# Patient Record
Sex: MALE | Race: BLACK OR AFRICAN AMERICAN | Employment: UNEMPLOYED | URBAN - METROPOLITAN AREA
[De-identification: names, ages, dates, MRNs, and addresses within clinical notes are randomized per-mention and may not be internally consistent; named-entity substitution may affect disease eponyms.]

---

## 2024-03-21 ENCOUNTER — HOSPITAL ENCOUNTER (EMERGENCY)
Age: 49
Discharge: HOME | End: 2024-03-21
Attending: EMERGENCY MEDICINE

## 2024-03-21 VITALS
HEIGHT: 70 IN | DIASTOLIC BLOOD PRESSURE: 104 MMHG | OXYGEN SATURATION: 91 % | RESPIRATION RATE: 22 BRPM | HEART RATE: 80 BPM | SYSTOLIC BLOOD PRESSURE: 158 MMHG | TEMPERATURE: 97.6 F | WEIGHT: 160 LBS | BODY MASS INDEX: 22.9 KG/M2

## 2024-03-21 DIAGNOSIS — F10.920 ACUTE ALCOHOLIC INTOXICATION WITHOUT COMPLICATION (HCC): ICD-10-CM

## 2024-03-21 DIAGNOSIS — T40.601A OPIATE OVERDOSE, ACCIDENTAL OR UNINTENTIONAL, INITIAL ENCOUNTER (HCC): Primary | ICD-10-CM

## 2024-03-21 LAB
ANION GAP SERPL CALC-SCNC: 9 MMOL/L (ref 2–11)
BASOPHILS # BLD: 0 K/UL (ref 0–0.2)
BASOPHILS NFR BLD: 1 % (ref 0–2)
BUN SERPL-MCNC: 10 MG/DL (ref 6–23)
CALCIUM SERPL-MCNC: 8.3 MG/DL (ref 8.3–10.4)
CHLORIDE SERPL-SCNC: 113 MMOL/L (ref 103–113)
CO2 SERPL-SCNC: 21 MMOL/L (ref 21–32)
CREAT SERPL-MCNC: 0.9 MG/DL (ref 0.8–1.5)
DIFFERENTIAL METHOD BLD: NORMAL
EOSINOPHIL # BLD: 0.1 K/UL (ref 0–0.8)
EOSINOPHIL NFR BLD: 2 % (ref 0.5–7.8)
ERYTHROCYTE [DISTWIDTH] IN BLOOD BY AUTOMATED COUNT: 14.3 % (ref 11.9–14.6)
ETHANOL SERPL-MCNC: 152 MG/DL (ref 0–0.08)
GLUCOSE SERPL-MCNC: 104 MG/DL (ref 65–100)
HCT VFR BLD AUTO: 43.3 % (ref 41.1–50.3)
HGB BLD-MCNC: 13.9 G/DL (ref 13.6–17.2)
IMM GRANULOCYTES # BLD AUTO: 0 K/UL (ref 0–0.5)
IMM GRANULOCYTES NFR BLD AUTO: 0 % (ref 0–5)
LYMPHOCYTES # BLD: 2 K/UL (ref 0.5–4.6)
LYMPHOCYTES NFR BLD: 32 % (ref 13–44)
MCH RBC QN AUTO: 29.6 PG (ref 26.1–32.9)
MCHC RBC AUTO-ENTMCNC: 32.1 G/DL (ref 31.4–35)
MCV RBC AUTO: 92.1 FL (ref 82–102)
MONOCYTES # BLD: 0.5 K/UL (ref 0.1–1.3)
MONOCYTES NFR BLD: 8 % (ref 4–12)
NEUTS SEG # BLD: 3.4 K/UL (ref 1.7–8.2)
NEUTS SEG NFR BLD: 57 % (ref 43–78)
NRBC # BLD: 0 K/UL (ref 0–0.2)
PLATELET # BLD AUTO: 245 K/UL (ref 150–450)
PMV BLD AUTO: 9.6 FL (ref 9.4–12.3)
POTASSIUM SERPL-SCNC: 4.7 MMOL/L (ref 3.5–5.1)
RBC # BLD AUTO: 4.7 M/UL (ref 4.23–5.6)
SODIUM SERPL-SCNC: 143 MMOL/L (ref 136–146)
WBC # BLD AUTO: 6.1 K/UL (ref 4.3–11.1)

## 2024-03-21 PROCEDURE — 6360000002 HC RX W HCPCS: Performed by: EMERGENCY MEDICINE

## 2024-03-21 PROCEDURE — 96375 TX/PRO/DX INJ NEW DRUG ADDON: CPT

## 2024-03-21 PROCEDURE — 80048 BASIC METABOLIC PNL TOTAL CA: CPT

## 2024-03-21 PROCEDURE — 96376 TX/PRO/DX INJ SAME DRUG ADON: CPT

## 2024-03-21 PROCEDURE — 6360000002 HC RX W HCPCS: Performed by: PHYSICIAN ASSISTANT

## 2024-03-21 PROCEDURE — 85025 COMPLETE CBC W/AUTO DIFF WBC: CPT

## 2024-03-21 PROCEDURE — 82077 ASSAY SPEC XCP UR&BREATH IA: CPT

## 2024-03-21 PROCEDURE — 6370000000 HC RX 637 (ALT 250 FOR IP): Performed by: EMERGENCY MEDICINE

## 2024-03-21 PROCEDURE — 99284 EMERGENCY DEPT VISIT MOD MDM: CPT

## 2024-03-21 PROCEDURE — 96374 THER/PROPH/DIAG INJ IV PUSH: CPT

## 2024-03-21 RX ORDER — ONDANSETRON 2 MG/ML
8 INJECTION INTRAMUSCULAR; INTRAVENOUS
Status: COMPLETED | OUTPATIENT
Start: 2024-03-21 | End: 2024-03-21

## 2024-03-21 RX ORDER — NALOXONE HYDROCHLORIDE 0.4 MG/ML
0.4 INJECTION, SOLUTION INTRAMUSCULAR; INTRAVENOUS; SUBCUTANEOUS PRN
Status: DISCONTINUED | OUTPATIENT
Start: 2024-03-21 | End: 2024-03-21 | Stop reason: HOSPADM

## 2024-03-21 RX ORDER — NALOXONE HYDROCHLORIDE 4 MG/.1ML
1 SPRAY NASAL ONCE
Status: COMPLETED | OUTPATIENT
Start: 2024-03-21 | End: 2024-03-21

## 2024-03-21 RX ADMIN — NALOXONE HYDROCHLORIDE 0.4 MG: 0.4 INJECTION, SOLUTION INTRAMUSCULAR; INTRAVENOUS; SUBCUTANEOUS at 07:48

## 2024-03-21 RX ADMIN — NALOXONE HYDROCHLORIDE 0.4 MG: 0.4 INJECTION, SOLUTION INTRAMUSCULAR; INTRAVENOUS; SUBCUTANEOUS at 06:01

## 2024-03-21 RX ADMIN — NALXONE HYDROCHLORIDE 0.4 MG: 0.4 INJECTION INTRAMUSCULAR; INTRAVENOUS; SUBCUTANEOUS at 04:25

## 2024-03-21 RX ADMIN — ONDANSETRON 8 MG: 2 INJECTION INTRAMUSCULAR; INTRAVENOUS at 13:48

## 2024-03-21 ASSESSMENT — LIFESTYLE VARIABLES
HOW MANY STANDARD DRINKS CONTAINING ALCOHOL DO YOU HAVE ON A TYPICAL DAY: PATIENT DECLINED
HOW OFTEN DO YOU HAVE A DRINK CONTAINING ALCOHOL: PATIENT DECLINED

## 2024-03-21 NOTE — CARE COORDINATION
Patient admits to be a regular drug user. Says he has suffered from [place] for a long time. He says he has been using opiates, methamphetamine, cocaine, and drinking alcohol. Given his opiate and alcohol use he is going to need detox before any long term treatment is possible. I'm giving him the number to the [facility] detox to get registered and suggesting he follow up with them daily to obtain a bed. I'm also suggesting he follow up with me so when he gets the bed we can work together to help him put a long term plan in place.

## 2024-03-21 NOTE — DISCHARGE INSTRUCTIONS
Stop using illegal drugs  Limit your alcohol consumption  Return to the shelter  Follow-up with drug rehab referrals

## 2024-03-21 NOTE — ED NOTES
Pt having trbl staying awake during assessment to answer questions at this time.      Karla Cruz, RN  03/21/24 4505

## 2024-03-21 NOTE — DISCHARGE SUMMARY
I have reviewed discharge instructions with the patient.  The patient verbalized understanding.    Patient left ED via Discharge Method: ambulatory to Home with self.    Opportunity for questions and clarification provided.       Patient given 1 scripts.         To continue your aftercare when you leave the hospital, you may receive an automated call from our care team to check in on how you are doing.  This is a free service and part of our promise to provide the best care and service to meet your aftercare needs.” If you have questions, or wish to unsubscribe from this service please call [phone].  Thank you for Choosing our [facility] Emergency Department.

## 2024-03-21 NOTE — ED PROVIDER NOTES
Emergency Department Provider Signout / Continuation of Care Note         DISPOSITION Decision To Discharge 03/21/2024 01:00:26 PM       ICD-10-CM    1. Opiate overdose, accidental or unintentional, initial encounter ([facility])  T40.601A       2. Acute alcoholic intoxication without complication ([facility])  F10.920           The patient's care was signed out to me at shift change.      Final Plan      Patient monitored for approximately 7 more hours  Patient has gradually improved and is no longer needing oxygen and not having some  Episodes  Patient has been seen by case management and favor  At this point the patient is stable for discharge and will be directed back to the [facility]  ED Course as of 03/21/24 1316   Thu Mar 21, 2024   0601 Patient requiring subsequent narcan dose of 0.4mg [MO]      ED Course User Index  [MO] Chan West PA       1 or more acute illnesses that pose a threat to life or bodily function.   Patient was discharged risks and benefits of hospitalization were considered.  Shared medical decision making was utilized in creating the patients health plan today.  I reviewed external records: ED visit note from an outside group.       Patient provided Narcan and specific instructions at the time of discharge secondary to his opiate overdose               Tony Yeboah MD  03/21/24 1316       Tony Yeboah MD  03/21/24 1348

## 2024-03-21 NOTE — CARE COORDINATION
Chart review complete, CM met with pt at bedside, pt easily awakens to speak with CM, pt states he is from [facility] and was sent from [place] to [facility] but was kicked out d/t drinking. Pt states he has been homeless since leaving [facility], pt states he has [employer] insurance but staff unable to verify at this time. Kurt with [facility] to see pt for assistance. Pt denies Drug use to CM but confirms with [facility]  of drug use prior to admission to ED.  Demographics, insurance and PCP confirmed with pt.    Unsure of dispo at this time, Kurt with [facility] working with pt to assist with detox plans.    CM staff will remain available to assist as needed with dc needs.       03/21/24 1010   Service Assessment   Patient Orientation Alert and Oriented   Cognition Alert   History Provided By Patient   Primary Caregiver Self   Accompanied By/Relationship none   Support Systems Parent   Patient's Healthcare Decision Maker is: Legal Next of Kin   PCP Verified by CM Yes   Prior Functional Level Independent in ADLs/IADLs   Current Functional Level Independent in ADLs/IADLs   Can patient return to prior living arrangement Yes   Ability to make needs known: Good   Family able to assist with home care needs: Yes   Would you like for me to discuss the discharge plan with any other family members/significant others, and if so, who? Yes   Financial Resources None   Community Resources Housing   CM/SW Referral Shelter placement;Homeless requesting intervention;Other (see comment)  (ETOH/Drug use)   Social/Functional History   Lives With Alone   Type of Home Homeless   Home Equipment None   ADL Assistance Independent   Ambulation Assistance Independent   Transfer Assistance Independent   Occupation Unemployed   Discharge Planning   Current Services Prior To Admission None   Potential Assistance Needed Transportation;Prescription Assistance   DME Ordered? No   Potential Assistance Purchasing Medications Yes   Type of Home Care Services None   Patient

## 2024-03-21 NOTE — ED PROVIDER NOTES
Emergency Department Provider Note       PCP: None, None   Age: 48 y.o.   Sex: male     DISPOSITION Decision To Discharge 03/21/2024 01:00:26 PM       ICD-10-CM    1. Opiate overdose, accidental or unintentional, initial encounter ([facility])  T40.601A       2. Acute alcoholic intoxication without complication ([facility])  F10.920         Medical Decision Making     Presented here via EMS with complaint of suspected overdose.  He has been pleasant here, receiving 2 doses of Narcan.  Patient will be observed for period time given needed Narcan doses and alcohol on board.  Case was discussed with oncoming physician, Dr. Yeboah.  He will monitor patient and be responsible for eventual disposition  ED Course as of 03/21/24 2222   Thu Mar 21, 2024   0601 Patient requiring subsequent narcan dose of 0.4mg [MO]      ED Course User Index  [MO] Chan West PA     1 or more acute illnesses that pose a threat to life or bodily function.   Over the counter drug management performed.  Prescription drug management performed.  Drug therapy given requiring intensive monitoring for toxicity.  Patient was discharged risks and benefits of hospitalization were considered.  Shared medical decision making was utilized in creating the patients health plan today.    I independently ordered and reviewed each unique test.                     Voice dictation software was used during the making of this note.  This software is not perfect and grammatical and other typographical errors may be present.  This note has not been completely proofread for errors.    History     48-year-old male brought in by EMS for overdose.  EMS was called out by bystanders from local street.  [place] and he got a little [place].  He adamantly denies any opioid drug used this evening.  EMS reports they found patient unresponsive to sternal rubs and actually saw some individuals beginning CPR.  They administered 0.5 mg of Narcan with

## 2024-03-21 NOTE — ED NOTES
Pt continuing to get out of the bed and taking off vitals equipment.      Karla Cruz, RN  03/21/24 0526

## 2024-03-21 NOTE — ED TRIAGE NOTES
Patient arrives via EMS after being found unresponsive. EMS gave 0.5 mg narcan with improvement in mental status. Upon arrival to ED patient is drowsy. Patient states that he did not take anything.

## 2024-04-11 ENCOUNTER — HOSPITAL ENCOUNTER (EMERGENCY)
Age: 49
Discharge: HOME | End: 2024-04-11
Attending: STUDENT IN AN ORGANIZED HEALTH CARE EDUCATION/TRAINING PROGRAM
Payer: COMMERCIAL

## 2024-04-11 VITALS
DIASTOLIC BLOOD PRESSURE: 47 MMHG | TEMPERATURE: 98.1 F | BODY MASS INDEX: 21.67 KG/M2 | OXYGEN SATURATION: 100 % | SYSTOLIC BLOOD PRESSURE: 148 MMHG | HEART RATE: 72 BPM | WEIGHT: 160 LBS | RESPIRATION RATE: 17 BRPM | HEIGHT: 72 IN

## 2024-04-11 DIAGNOSIS — Z76.0 ENCOUNTER FOR MEDICATION REFILL: Primary | ICD-10-CM

## 2024-04-11 PROCEDURE — 99283 EMERGENCY DEPT VISIT LOW MDM: CPT

## 2024-04-11 NOTE — ED PROVIDER NOTES
[facility]  Emergency Department    DISPOSITION Decision To Discharge 04/11/2024 01:31:18 AM       ICD-10-CM    1. Encounter for medication refill  Z76.0         ED Course     ED Course as of 04/11/24 0209   Thu Apr 11, 2024   0139 48M h/o asthma presents for refill of symbicort. He has rx at [facility] waiting for him but cannot afford. I offered a paper rx for him to take to different pharmacies which he declined. No available inhalers in ER. No indicaiton for breathign treatment at this time. Suspect malingering for transport to hot. Will have CM reach out in AM to see if able to provide resources for financial assistance. [ER]      ED Course User Index  [ER] Greg Gordon MD     Data Reviewed and Analyzed:  1 acute, uncomplicated illness or injury.  Diagnosis or care significantly limited by social determinants of health access to medication.    I independently ordered and reviewed each unique test.        ESAU Ascencio is a 48 y.o. male with a history of asthma who presents to the ED with complaint of medication refill.  Patient states He had a hotel recently moved here from [place].  LATER TODAY HE HAD AN ASTHMA EXACERBATION AND HAD TO USE HIS SYMBICORT INHALER.  HE IS NOW OUT OF THIS MEDICATION.  STATES HE IS CONCERNED HE MAY HAVE A FUTURE ATTACK.  HE DENIES ANY SHORTNESS OF BREATH OR WHEEZING AT THIS TIME.  PATIENT CALLED EMS AS HE WAS 7 MILES FROM THE [facility] [address] THAT HE IS STAYING AT.  HE ASKED TO BE BROUGHT TO THE HOTEL CLOSEST TO THE [facility]    History   No past medical history on file.  No past surgical history on file.  No family history on file.  No Known Allergies    Physical Exam     Vitals:    04/11/24 0116   BP: (!) 148/47   Pulse: 72   Resp: 17   Temp: 98.1 °F (36.7 °C)   TempSrc: Oral   SpO2: 100%   Weight: 72.6 kg (160 lb)   Height: 1.829 m (6')     Nursing note and vitals reviewed.    Constitutional: Well developed, NAD  HEENT: Atraumatic, conjugate gaze, EOM